# Patient Record
(demographics unavailable — no encounter records)

---

## 2024-10-31 NOTE — HEALTH RISK ASSESSMENT
[Independent] : using telephone [Some assistance needed] : managing finances [No falls in past year] : Patient reported no falls in the past year [Yes] : The patient does have visual impairment [HRA Reviewed] : Health risk assessment reviewed [FreeTextEntry8] : walk with cane [TimeGetUpGo] : 6 [de-identified] : ambulate with cane in home and outside, asking for wheelchair

## 2024-10-31 NOTE — HISTORY OF PRESENT ILLNESS
[In-Place] : has aide services in-place [A] : A [House Calls Co-Management Patient] : [unfilled] is a House Calls co-management patient [Patient is stable] : patient is stable [Education provided] : education provided [Patient] : patient [Spouse] : spouse [Family Member] : family member [Formal Caregiver] : formal caregiver [LastPCPVisitDate] : 10/30/24 [FreeTextEntry4] : cardiology, otolaryngology, ophthalmology [FreeTextEntry1] : CAD s/p CABG x 3, ICD off Eliquis,  b/l knee pain, unsteady gait, thrombocytopenia, [FreeTextEntry2] : 10/31/2024 COVID SCREEN: Patient or caretaker denies fever, cough, trouble breathing, rash, vomiting. Patient has not been in close contact with anyone who is COVID-19 positive, or suspected of having COVID-19.  N95 mask, gloves, eye wear and gown (if indicated) used during visit: Yes.  Total face to face time with patient is 45 min.  JESUS CHAPARRO is an 85 year with  hypertension, thrombocytopenia, CAD  s/p CABG x 3,  ICD, hard of hearing with aid, forgetfulness, hyperlipidemia, b/l breast pain, b/l knee pain, unsteady gait, Vitamin D deficiency, seen today for follow-up home visit  Accompanying patient today is spouse, daughter (Joanne) and HHA.   Today's visit and Review: - feels well overall - discontinued Eliquis; started Potassium but did not get from pharmacy and not sure of the dose - Medications reviewed - Weight: gained 3lbs in the last month - Mild cough, possibly related to recent flu vaccine - No issues with BM - No urinary issues - c/o knee pain affecting mobility, ordering wheelchair - Has referral for PT  Geriatric Assessment: -Appetite/weight: appetite good -Gait/falls: unsteady walking, using cane, asking for wheelchair, No falls -Pain: b/l pain Tylenol PRN -Sleep: sleep well -BMs: regular, no constipation/diarrhea, continent -Urine: no pain, no frequent/urgency, no cloudy/blood in urine, continent -Skin: intact with some bruises  -DME: cane -Mood/memory: alert, oriented x4, Kazakh speaking -Communication: conversational -Health Maintenance: vaccine UTD  Hospitalization #March 2024 ED at [facility] for nose bleeding. Pt discharged home on the same day.   #2021 [facility] admission  Assessments: 1. CAD (coronary artery disease)  -S/p CABG 12 years. -BP controlled on medication -continue current medication Amlodipine, carvedilol, valsartan -Daughter reports PCP d/c Eplerenone on 10/30/24 due to side effect of angioedema with b/l breast pain -following with cardiologist 2, ICD (implantable cardioverter-defibrillator) in place  -PCP d/c Eliquis on 10/30/24 due to thrombocytopenia -following with cardiologist 3. Right knee pain  -Tylenol PRN  -pending PT referral by PCP -waiting for knee brace -Asking for wheelchair   Patient/ patient's caregiver reports no weight gain >3 lbs in one months. No changes in dentition or swallowing reported, No changes in hearing or vision reported. No changes in Cognition reported. Patient denies any symptoms of depression or anxiety. Patient is ADL independent/dependent and IADL dependent. Changes in gait due to b/l knee pain, no falls reported.  Patient's home environment is safe.   Goals of care discussed 10min. Full Code

## 2024-10-31 NOTE — ADDENDUM
[FreeTextEntry1] : Documented by Izabella Mix acting as a scribe under Katalina Rodriguez NP. 10/31/2024

## 2024-10-31 NOTE — HISTORY OF PRESENT ILLNESS
[In-Place] : has aide services in-place [A] : A [House Calls Co-Management Patient] : [unfilled] is a House Calls co-management patient [Patient is stable] : patient is stable [Education provided] : education provided [Patient] : patient [Spouse] : spouse [Family Member] : family member [Formal Caregiver] : formal caregiver [LastPCPVisitDate] : 10/30/24 [FreeTextEntry4] : cardiology, otolaryngology, ophthalmology [FreeTextEntry1] : CAD s/p CABG x 3, ICD off Eliquis,  b/l knee pain, unsteady gait, thrombocytopenia, [FreeTextEntry2] : 10/31/2024 COVID SCREEN: Patient or caretaker denies fever, cough, trouble breathing, rash, vomiting. Patient has not been in close contact with anyone who is COVID-19 positive, or suspected of having COVID-19.  N95 mask, gloves, eye wear and gown (if indicated) used during visit: Yes.  Total face to face time with patient is 45 min.  JESUS CHAPARRO is an 85 year with  hypertension, thrombocytopenia, CAD  s/p CABG x 3,  ICD, hard of hearing with aid, forgetfulness, hyperlipidemia, b/l breast pain, b/l knee pain, unsteady gait, Vitamin D deficiency, seen today for follow-up home visit  Accompanying patient today is spouse, daughter (Joanne) and HHA.   Today's visit and Review: - feels well overall - discontinued Eliquis; started Potassium but did not get from pharmacy and not sure of the dose - Medications reviewed - Weight: gained 3lbs in the last month - Mild cough, possibly related to recent flu vaccine - No issues with BM - No urinary issues - c/o knee pain affecting mobility, ordering wheelchair - Has referral for PT  Geriatric Assessment: -Appetite/weight: appetite good -Gait/falls: unsteady walking, using cane, asking for wheelchair, No falls -Pain: b/l pain Tylenol PRN -Sleep: sleep well -BMs: regular, no constipation/diarrhea, continent -Urine: no pain, no frequent/urgency, no cloudy/blood in urine, continent -Skin: intact with some bruises  -DME: cane -Mood/memory: alert, oriented x4, Chilean speaking -Communication: conversational -Health Maintenance: vaccine UTD  Hospitalization #March 2024 ED at [facility] for nose bleeding. Pt discharged home on the same day.   #2021 [facility] Hospital admission  Assessments: 1. CAD (coronary artery disease)  -S/p CABG 12 years. -BP controlled on medication -continue current medication Amlodipine, carvedilol, valsartan -Daughter reports PCP d/c Eplerenone on 10/30/24 due to side effect of angioedema with b/l breast pain -following with cardiologist 2, ICD (implantable cardioverter-defibrillator) in place  -PCP d/c Eliquis on 10/30/24 due to thrombocytopenia -following with cardiologist 3. Right knee pain  -Tylenol PRN  -pending PT referral by PCP -waiting for knee brace -Asking for wheelchair   Patient/ patient's caregiver reports no weight gain >3 lbs in one months. No changes in dentition or swallowing reported, No changes in hearing or vision reported. No changes in Cognition reported. Patient denies any symptoms of depression or anxiety. Patient is ADL independent/dependent and IADL dependent. Changes in gait due to b/l knee pain, no falls reported.  Patient's home environment is safe.   Goals of care discussed 10min. Full Code

## 2024-10-31 NOTE — COUNSELING
[] : eye exam [Not Recommended] : Aspirin use not recommended due to overall prognosis [Advanced Directives discussed: ____] : Advanced directives discussed: [unfilled] [Annual Discussion and review of: ___] : Annual discussion and review of [unfilled] [Full Code] : Code Status: Full Code [No Limitations] : Treatment Guidelines: No limitations [Long Term Intubation] : Intubation: Long term intubation [Obese -  ( BMI  >29.9 )] : obese - ( BMI  >29.9 ) [Sodium restriction 2gm recommended] : sodium restriction 2 gm recommended [Non - Smoker] : non-smoker [Smoke/CO Detectors] : smoke/CO detectors [Use grab bars] : use grab bars [Use assistive device to avoid falls] : use assistive device to avoid falls [Remove clutter and unsafe carpeting to avoid falls] : remove clutter and unsafe carpeting to avoid falls [Patient not on disease-modifying anti-rheumatic drug due to overall prognosis] : Patient not on disease-modifying anti-rheumatic drug due to overall prognosis [Improve mobility] : improve mobility [Improve pain control] : improve pain control [Minimize unnecessary interventions] : minimize unnecessary interventions [Discussed disease trajectory with patient/caregiver] : discussed disease trajectory with patient/caregiver [_____] : HCP: [unfilled]

## 2024-10-31 NOTE — REVIEW OF SYSTEMS
[Vision Problems] : vision problems [Hearing Loss] : hearing loss [Joint Pain] : joint pain [Back Pain] : back pain [Confusion] : confusion [Unsteady Walk] : ataxia [Negative] : Heme/Lymph [Discharge] : no discharge [Pain] : no pain [Redness] : no redness [Dryness] : no dryness [Itching] : no itching [Earache] : no earache [Nosebleeds] : no nosebleeds [Postnasal Drip] : no postnasal drip [Nasal Discharge] : no nasal discharge [Sore Throat] : no sore throat [Hoarseness] : no hoarseness [Joint Stiffness] : no joint stiffness [Muscle Pain] : no muscle pain [Muscle Weakness] : no muscle weakness [Joint Swelling] : no joint swelling [Headache] : no headache [Dizziness] : no dizziness [Fainting] : no fainting [Memory Loss] : no memory loss [FreeTextEntry3] : Glaucoma [FreeTextEntry4] : Hearing loss with aid [FreeTextEntry9] : b/l knee pain [de-identified] : periods of forgetfulness, unsteady gait

## 2024-10-31 NOTE — CHRONIC CARE ASSESSMENT
[Less than 3 Times Per Week] : exercises less than 3 times per week [General Adherence] : and is generally adherent [___ Times Per Week] : exercises [unfilled] times per week [Walking] : walking [Low Fat Diet] : low fat [Low Carb Diet] : low carbohydrate [Low Salt Diet] : low salt [PPS Score: ____] : Palliative Performance Scale (PPS) Score: [unfilled] [de-identified] : Extensive cardiac history( CAD  s/p CABG, ICD), hard of hearing with aid, b/l knee pain, thrombocytopenia, [de-identified] : activity as tolerated. [de-identified] : low fat, low sodium, low carbohydrate

## 2024-10-31 NOTE — PHYSICAL EXAM
[Normal Voice/Communication] : normal voice communication [EOMI] : extra ocular movement intact [Normal TMs] : both tympanic membranes were normal [No LAD] : no lymphadenopathy [Clear to Auscultation] : lungs were clear to auscultation bilaterally [No Accessory Muscle Use] : no accessory muscle use [Regular Rhythm] : with a regular rhythm [Normal S1, S2] : normal S1 and S2 [No Murmurs] : no murmurs heard [No Edema] : there was no peripheral edema [No Acute Distress] : no acute distress [Normal Sclera/Conjunctiva] : normal sclera/conjunctiva [Normal Outer Ear/Nose] : the ears and nose were normal in appearance [No JVD] : no jugular venous distention [No Respiratory Distress] : no respiratory distress [Normal Rate] : heart rate was normal  [No Nipple Discharge] : no nipple discharge [Normal Bowel Sounds] : normal bowel sounds [Non Tender] : non-tender [Soft] : abdomen soft [Not Distended] : not distended [Normal Post Cervical Nodes] : no posterior cervical lymphadenopathy [Normal Anterior Cervical Nodes] : no anterior cervical lymphadenopathy [No CVA Tenderness] : no ~M costovertebral angle tenderness [No Skin Lesions] : no skin lesions [Cranial Nerves Intact] : cranial nerves 2-12 were intact [Normal Reflexes] : deep tendon reflexes were 2+ and symmetric [Oriented x3] : oriented to person, place, and time [Over the Past 2 Weeks, Have You Felt Down, Depressed, or Hopeless?] : 1.) Over the past 2 weeks, have you felt down, depressed, or hopeless? No [Over the Past 2 Weeks, Have You Felt Little Interest or Pleasure Doing Things?] : 2.) Over the past 2 weeks, have you felt little interest or pleasure doing things? No [Foot Ulcers] : no foot ulcers [de-identified] : Occitan speaking male received sitting in chair, able to walk with cane [de-identified] : using glasses [de-identified] : hard of hearing, b/l hearing aid [de-identified] : b/l breast pain [de-identified] : obese [de-identified] : unsteady gait [de-identified] : dry rash on the left foot

## 2024-10-31 NOTE — END OF VISIT
[FreeTextEntry3] : Documented by Izabella Mix acting as a scribe for Katalina Rodriguez NP. 10/31/2024   All medical record entries made by the Scribe were at my direction and personally dictated by me on 10/31/2024. I have reviewed the chart and agree that the record accurately reflects my personal performance of the history, physical exam, assessment and plan. I have also personally directed, reviewed, and agreed with the chart.

## 2024-10-31 NOTE — CHRONIC CARE ASSESSMENT
[Less than 3 Times Per Week] : exercises less than 3 times per week [General Adherence] : and is generally adherent [___ Times Per Week] : exercises [unfilled] times per week [Walking] : walking [Low Fat Diet] : low fat [Low Carb Diet] : low carbohydrate [Low Salt Diet] : low salt [PPS Score: ____] : Palliative Performance Scale (PPS) Score: [unfilled] [de-identified] : Extensive cardiac history( CAD  s/p CABG, ICD), hard of hearing with aid, b/l knee pain, thrombocytopenia, [de-identified] : activity as tolerated. [de-identified] : low fat, low sodium, low carbohydrate

## 2024-10-31 NOTE — PHYSICAL EXAM
[Normal Voice/Communication] : normal voice communication [EOMI] : extra ocular movement intact [Normal TMs] : both tympanic membranes were normal [No LAD] : no lymphadenopathy [Clear to Auscultation] : lungs were clear to auscultation bilaterally [No Accessory Muscle Use] : no accessory muscle use [Regular Rhythm] : with a regular rhythm [Normal S1, S2] : normal S1 and S2 [No Edema] : there was no peripheral edema [No Murmurs] : no murmurs heard [No Acute Distress] : no acute distress [Normal Sclera/Conjunctiva] : normal sclera/conjunctiva [Normal Outer Ear/Nose] : the ears and nose were normal in appearance [No JVD] : no jugular venous distention [No Respiratory Distress] : no respiratory distress [Normal Rate] : heart rate was normal  [No Nipple Discharge] : no nipple discharge [Normal Bowel Sounds] : normal bowel sounds [Non Tender] : non-tender [Soft] : abdomen soft [Not Distended] : not distended [Normal Post Cervical Nodes] : no posterior cervical lymphadenopathy [Normal Anterior Cervical Nodes] : no anterior cervical lymphadenopathy [No CVA Tenderness] : no ~M costovertebral angle tenderness [No Skin Lesions] : no skin lesions [Cranial Nerves Intact] : cranial nerves 2-12 were intact [Normal Reflexes] : deep tendon reflexes were 2+ and symmetric [Oriented x3] : oriented to person, place, and time [Over the Past 2 Weeks, Have You Felt Down, Depressed, or Hopeless?] : 1.) Over the past 2 weeks, have you felt down, depressed, or hopeless? No [Over the Past 2 Weeks, Have You Felt Little Interest or Pleasure Doing Things?] : 2.) Over the past 2 weeks, have you felt little interest or pleasure doing things? No [Foot Ulcers] : no foot ulcers [de-identified] : Upper sorbian speaking male received sitting in chair, able to walk with cane [de-identified] : using glasses [de-identified] : hard of hearing, b/l hearing aid [de-identified] : b/l breast pain [de-identified] : obese [de-identified] : unsteady gait [de-identified] : dry rash on the left foot

## 2024-10-31 NOTE — REASON FOR VISIT
[Follow-Up] : a follow-up visit [FreeTextEntry1] : Extensive cardiac history (hypertension, CAD  s/p CABG x 3,  ICD),  hard of hearing with aid, forgetfulness, b/l knee pain                                                   [Spouse] : spouse [Family Member] : family member [Formal Caregiver] : formal caregiver [Other: _____] : [unfilled] [Other: ______] : provided by CATHY [Time Spent: ____ minutes] : Total time spent using  services: [unfilled] minutes. The patient's primary language is not English thus required  services. [Pre-Visit Preparation] : pre-visit preparation was done [Intercurrent Specialty/Sub-specialty Visits] : the patient has intercurrent specialty/sub-specialty visits [FreeTextEntry2] : chart reviewed [FreeTextEntry3] : cardiology, otolaryngology, ophthalmology, [TWNoteComboBox1] : [place] Ze[place]ander

## 2024-10-31 NOTE — REASON FOR VISIT
[Follow-Up] : a follow-up visit [FreeTextEntry1] : Extensive cardiac history (hypertension, CAD  s/p CABG x 3,  ICD),  hard of hearing with aid, forgetfulness, b/l knee pain                                                   [Spouse] : spouse [Family Member] : family member [Formal Caregiver] : formal caregiver [Other: _____] : [unfilled] [Other: ______] : provided by CATHY [Time Spent: ____ minutes] : Total time spent using  services: [unfilled] minutes. The patient's primary language is not English thus required  services. [Pre-Visit Preparation] : pre-visit preparation was done [Intercurrent Specialty/Sub-specialty Visits] : the patient has intercurrent specialty/sub-specialty visits [FreeTextEntry2] : chart reviewed [FreeTextEntry3] : cardiology, otolaryngology, ophthalmology, [TWNoteComboBox1] : Congolese

## 2024-10-31 NOTE — REVIEW OF SYSTEMS
[Vision Problems] : vision problems [Hearing Loss] : hearing loss [Joint Pain] : joint pain [Back Pain] : back pain [Confusion] : confusion [Unsteady Walk] : ataxia [Negative] : Heme/Lymph [Discharge] : no discharge [Pain] : no pain [Redness] : no redness [Dryness] : no dryness [Itching] : no itching [Earache] : no earache [Nosebleeds] : no nosebleeds [Postnasal Drip] : no postnasal drip [Nasal Discharge] : no nasal discharge [Sore Throat] : no sore throat [Hoarseness] : no hoarseness [Joint Stiffness] : no joint stiffness [Muscle Pain] : no muscle pain [Muscle Weakness] : no muscle weakness [Joint Swelling] : no joint swelling [Headache] : no headache [Dizziness] : no dizziness [Fainting] : no fainting [Memory Loss] : no memory loss [FreeTextEntry3] : Glaucoma [FreeTextEntry4] : Hearing loss with aid [FreeTextEntry9] : b/l knee pain [de-identified] : periods of forgetfulness, unsteady gait

## 2024-10-31 NOTE — HEALTH RISK ASSESSMENT
[Independent] : using telephone [Some assistance needed] : managing finances [No falls in past year] : Patient reported no falls in the past year [Yes] : The patient does have visual impairment [HRA Reviewed] : Health risk assessment reviewed [FreeTextEntry8] : walk with cane [TimeGetUpGo] : 6 [de-identified] : ambulate with cane in home and outside, asking for wheelchair

## 2025-02-20 NOTE — END OF VISIT
[FreeTextEntry3] : All medical record entries made by the Scribe were at my direction and personally dictated by me. I have reviewed the chart and agree that the record accurately reflects my personal performance of the history, physical exam, assessment and plan. I have also personally directed, reviewed, and agreed with the chart.

## 2025-02-20 NOTE — CHRONIC CARE ASSESSMENT
[___ Times Per Week] : exercises [unfilled] times per week [General Adherence] : and is generally adherent [Walking] : walking [Low Fat Diet] : low fat [Low Carb Diet] : low carbohydrate [Low Salt Diet] : low salt [PPS Score: ____] : Palliative Performance Scale (PPS) Score: [unfilled] [de-identified] : Extensive cardiac history( CAD  s/p CABG, ICD), hard of hearing with aid, b/l knee pain, thrombocytopenia, [de-identified] : activity as tolerated. [FreeTextEntry9] : only go outside for medical visit [de-identified] : low fat, low sodium, low carbohydrate

## 2025-02-20 NOTE — HEALTH RISK ASSESSMENT
[Independent] : using telephone [Some assistance needed] : managing finances [No falls in past year] : Patient reported no falls in the past year [Yes] : The patient does have visual impairment [HRA Reviewed] : Health risk assessment reviewed [FreeTextEntry8] : walk with cane [TimeGetUpGo] : 4 [de-identified] : ambulate with cane in home and outside, unsteady gait

## 2025-02-20 NOTE — REASON FOR VISIT
[Follow-Up] : a follow-up visit [Family Member] : family member [Formal Caregiver] : formal caregiver [Pre-Visit Preparation] : pre-visit preparation was done [Intercurrent Specialty/Sub-specialty Visits] : the patient has intercurrent specialty/sub-specialty visits [Interpreters_FullName] : Joanne [FreeTextEntry2] : chart reviewed [FreeTextEntry3] : cardiology, otolaryngology, ophthalmology, [TWNoteComboBox1] : Bolivian

## 2025-02-20 NOTE — ADDENDUM
[FreeTextEntry1] : Documented by Izabella Mix acting as a scribe under Katalina Rodriguez NP. 02/20/2025

## 2025-02-20 NOTE — PHYSICAL EXAM
[No CVA Tenderness] : no ~M costovertebral angle tenderness [No Acute Distress] : no acute distress [Normal Voice/Communication] : normal voice communication [Normal Sclera/Conjunctiva] : normal sclera/conjunctiva [EOMI] : extra ocular movement intact [Normal Outer Ear/Nose] : the ears and nose were normal in appearance [Normal TMs] : both tympanic membranes were normal [No JVD] : no jugular venous distention [No LAD] : no lymphadenopathy [No Respiratory Distress] : no respiratory distress [Clear to Auscultation] : lungs were clear to auscultation bilaterally [No Accessory Muscle Use] : no accessory muscle use [Normal Rate] : heart rate was normal  [Regular Rhythm] : with a regular rhythm [Normal S1, S2] : normal S1 and S2 [No Murmurs] : no murmurs heard [No Edema] : there was no peripheral edema [No Nipple Discharge] : no nipple discharge [Normal Bowel Sounds] : normal bowel sounds [Non Tender] : non-tender [Soft] : abdomen soft [Not Distended] : not distended [Normal Post Cervical Nodes] : no posterior cervical lymphadenopathy [Normal Anterior Cervical Nodes] : no anterior cervical lymphadenopathy [No Joint Swelling] : no joint swelling seen [No Skin Lesions] : no skin lesions [Cranial Nerves Intact] : cranial nerves 2-12 were intact [Normal Reflexes] : deep tendon reflexes were 2+ and symmetric [Oriented x3] : oriented to person, place, and time [Over the Past 2 Weeks, Have You Felt Down, Depressed, or Hopeless?] : 1.) Over the past 2 weeks, have you felt down, depressed, or hopeless? No [Over the Past 2 Weeks, Have You Felt Little Interest or Pleasure Doing Things?] : 2.) Over the past 2 weeks, have you felt little interest or pleasure doing things? No [Foot Ulcers] : no foot ulcers [de-identified] : using glasses [de-identified] : hard of hearing, b/l hearing aid [de-identified] : b/l breast pain [de-identified] : obese [de-identified] : unsteady gait

## 2025-02-20 NOTE — HEALTH RISK ASSESSMENT
[Independent] : using telephone [Some assistance needed] : managing finances [No falls in past year] : Patient reported no falls in the past year [Yes] : The patient does have visual impairment [HRA Reviewed] : Health risk assessment reviewed [FreeTextEntry8] : walk with cane [TimeGetUpGo] : 4 [de-identified] : ambulate with cane in home and outside, unsteady gait

## 2025-02-20 NOTE — REASON FOR VISIT
[Follow-Up] : a follow-up visit [Family Member] : family member [Formal Caregiver] : formal caregiver [Pre-Visit Preparation] : pre-visit preparation was done [Intercurrent Specialty/Sub-specialty Visits] : the patient has intercurrent specialty/sub-specialty visits [Interpreters_FullName] : Joanne [FreeTextEntry2] : chart reviewed [FreeTextEntry3] : cardiology, otolaryngology, ophthalmology, [TWNoteComboBox1] : Citizen of [place]

## 2025-02-20 NOTE — CHRONIC CARE ASSESSMENT
[___ Times Per Week] : exercises [unfilled] times per week [General Adherence] : and is generally adherent [Walking] : walking [Low Fat Diet] : low fat [Low Carb Diet] : low carbohydrate [Low Salt Diet] : low salt [PPS Score: ____] : Palliative Performance Scale (PPS) Score: [unfilled] [de-identified] : Extensive cardiac history( CAD  s/p CABG, ICD), hard of hearing with aid, b/l knee pain, thrombocytopenia, [de-identified] : activity as tolerated. [FreeTextEntry9] : only go outside for medical visit [de-identified] : low fat, low sodium, low carbohydrate

## 2025-02-20 NOTE — PHYSICAL EXAM
[No CVA Tenderness] : no ~M costovertebral angle tenderness [No Acute Distress] : no acute distress [Normal Voice/Communication] : normal voice communication [Normal Sclera/Conjunctiva] : normal sclera/conjunctiva [EOMI] : extra ocular movement intact [Normal Outer Ear/Nose] : the ears and nose were normal in appearance [Normal TMs] : both tympanic membranes were normal [No JVD] : no jugular venous distention [No LAD] : no lymphadenopathy [No Respiratory Distress] : no respiratory distress [Clear to Auscultation] : lungs were clear to auscultation bilaterally [No Accessory Muscle Use] : no accessory muscle use [Normal Rate] : heart rate was normal  [Regular Rhythm] : with a regular rhythm [Normal S1, S2] : normal S1 and S2 [No Murmurs] : no murmurs heard [No Edema] : there was no peripheral edema [No Nipple Discharge] : no nipple discharge [Normal Bowel Sounds] : normal bowel sounds [Non Tender] : non-tender [Soft] : abdomen soft [Not Distended] : not distended [Normal Post Cervical Nodes] : no posterior cervical lymphadenopathy [Normal Anterior Cervical Nodes] : no anterior cervical lymphadenopathy [No Joint Swelling] : no joint swelling seen [No Skin Lesions] : no skin lesions [Cranial Nerves Intact] : cranial nerves 2-12 were intact [Normal Reflexes] : deep tendon reflexes were 2+ and symmetric [Oriented x3] : oriented to person, place, and time [Over the Past 2 Weeks, Have You Felt Down, Depressed, or Hopeless?] : 1.) Over the past 2 weeks, have you felt down, depressed, or hopeless? No [Over the Past 2 Weeks, Have You Felt Little Interest or Pleasure Doing Things?] : 2.) Over the past 2 weeks, have you felt little interest or pleasure doing things? No [Foot Ulcers] : no foot ulcers [de-identified] : using glasses [de-identified] : hard of hearing, b/l hearing aid [de-identified] : b/l breast pain [de-identified] : obese [de-identified] : unsteady gait

## 2025-02-20 NOTE — HISTORY OF PRESENT ILLNESS
[In-Place] : has aide services in-place [A] : A [House Calls Co-Management Patient] : [unfilled] is a House Calls co-management patient [Patient is stable] : patient is stable [Education provided] : education provided [Patient] : patient [Spouse] : spouse [Family Member] : family member [Formal Caregiver] : formal caregiver [LastPVisitDate] : 12/24 [FreeTextEntry4] : cardiology, otolaryngology, ophthalmology [FreeTextEntry1] : CAD s/p CABG x 3, ICD off Eliquis,  b/l knee pain, unsteady gait, thrombocytopenia, [FreeTextEntry2] : 2/20/25 COVID SCREEN: Patient or caretaker denies fever, cough, trouble breathing, rash, vomiting. Patient has not been in close contact with anyone who is COVID-19 positive, or suspected of having COVID-19.  N95 mask, gloves, eye wear and gown (if indicated) used during visit: Yes.  Total face to face time with patient is 45 min.  JESUS CHAPARRO is an 86 year with  hypertension, thrombocytopenia, CAD  s/p CABG x 3,  ICD, hard of hearing with aid, forgetfulness, hyperlipidemia, b/l breast pain, b/l knee pain, unsteady gait, Vitamin D deficiency, seen today for follow-up home visit  Accompanying patient today is spouse, daughter (Joanne) and HHA.  Patient and daughter Joanne was primary historian   Today's visit and Review 2/20/25: -Reported feeling tired lately - Recent labwork unremarkable from community PCP office - Reports R>L knee pain. Tylenol, knee brace, pain relief cream. - Reports concern over b/l nipple pain. No discharge, daughter reports lump on palpation. Referral given for outside GYN to assess. - EP last seen last year. Advised to f/u every 6 months. - Cardiologist and EP to be seen May. Advised to advance EP visit due to pt status of complaining tiredness  - Medications reviewed, refills sent to pharmacy. - Requests cane from DME. - Encouraged pt to move around more.  Geriatric Assessment: -Appetite/weight: appetite good -Gait/falls: unsteady walking, using cane,  No falls. -Pain: b/l knee pain Tylenol PRN -Sleep: sleep well -BMs: regular, no constipation/diarrhea, continent -Urine: no pain, no frequent/urgency, no cloudy/blood in urine, continent -Skin: intact -DME: cane -Mood/memory: alert, oriented x4, Slovenian speaking -Communication: conversational -Health Maintenance: vaccine UTD  Hospitalization #March 2024 ED at [facility] for nose bleeding. Pt discharged home on the same day.   #2021 [facility] admission  Patient/ patient's caregiver reports no weight gain >3 lbs in one months. No changes in dentition or swallowing reported, No changes in hearing or vision reported. No changes in Cognition reported. Patient denies any symptoms of depression or anxiety. Patient is ADL independent/dependent and IADL dependent.  Patient's home environment is safe.   Goals of care discussed 20min. Full Code  Discussion of advance care planning for JESUS CHAPARRO. Total Time Spent: 20 minutes Participants: Patient and Healthcare Proxy Discussion: Patient/HCP/daughter clearly state wishes Prognosis: Guarded Completed Forms:  MOLST not completed Goal of Care Summary:  Full code, Artificial feeding or hydration, determine use of antibiotics

## 2025-02-20 NOTE — HISTORY OF PRESENT ILLNESS
[In-Place] : has aide services in-place [A] : A [House Calls Co-Management Patient] : [unfilled] is a House Calls co-management patient [Patient is stable] : patient is stable [Education provided] : education provided [Patient] : patient [Spouse] : spouse [Family Member] : family member [Formal Caregiver] : formal caregiver [LastPVisitDate] : 12/24 [FreeTextEntry4] : cardiology, otolaryngology, ophthalmology [FreeTextEntry1] : CAD s/p CABG x 3, ICD off Eliquis,  b/l knee pain, unsteady gait, thrombocytopenia, [FreeTextEntry2] : 2/20/25 COVID SCREEN: Patient or caretaker denies fever, cough, trouble breathing, rash, vomiting. Patient has not been in close contact with anyone who is COVID-19 positive, or suspected of having COVID-19.  N95 mask, gloves, eye wear and gown (if indicated) used during visit: Yes.  Total face to face time with patient is 45 min.  JESUS CHAPARRO is an 86 year with  hypertension, thrombocytopenia, CAD  s/p CABG x 3,  ICD, hard of hearing with aid, forgetfulness, hyperlipidemia, b/l breast pain, b/l knee pain, unsteady gait, Vitamin D deficiency, seen today for follow-up home visit  Accompanying patient today is spouse, daughter (Joanne) and HHA.  Patient and daughter Joanne was primary historian   Today's visit and Review 2/20/25: -Reported feeling tired lately - Recent labwork unremarkable from community PCP office - Reports R>L knee pain. Tylenol, knee brace, pain relief cream. - Reports concern over b/l nipple pain. No discharge, daughter reports lump on palpation. Referral given for outside GYN to assess. - EP last seen last year. Advised to f/u every 6 months. - Cardiologist and EP to be seen May. Advised to advance EP visit due to pt status of complaining tiredness  - Medications reviewed, refills sent to pharmacy. - Requests cane from DME. - Encouraged pt to move around more.  Geriatric Assessment: -Appetite/weight: appetite good -Gait/falls: unsteady walking, using cane,  No falls. -Pain: b/l knee pain Tylenol PRN -Sleep: sleep well -BMs: regular, no constipation/diarrhea, continent -Urine: no pain, no frequent/urgency, no cloudy/blood in urine, continent -Skin: intact -DME: cane -Mood/memory: alert, oriented x4, Pashto speaking -Communication: conversational -Health Maintenance: vaccine UTD  Hospitalization #March 2024 ED at [facility] for nose bleeding. Pt discharged home on the same day.   #2021 [facility] admission  Patient/ patient's caregiver reports no weight gain >3 lbs in one months. No changes in dentition or swallowing reported, No changes in hearing or vision reported. No changes in Cognition reported. Patient denies any symptoms of depression or anxiety. Patient is ADL independent/dependent and IADL dependent.  Patient's home environment is safe.   Goals of care discussed 20min. Full Code  Discussion of advance care planning for JESUS CHAPARRO. Total Time Spent: 20 minutes Participants: Patient and Healthcare Proxy Discussion: Patient/HCP/daughter clearly state wishes Prognosis: Guarded Completed Forms:  MOLST not completed Goal of Care Summary:  Full code, Artificial feeding or hydration, determine use of antibiotics

## 2025-02-20 NOTE — COUNSELING
[Not Recommended] : Aspirin use not recommended due to overall prognosis [Full Code] : Code Status: Full Code [No Limitations] : Treatment Guidelines: No limitations [Long Term Intubation] : Intubation: Long term intubation [Obese -  ( BMI  >29.9 )] : obese - ( BMI  >29.9 ) [Sodium restriction 2gm recommended] : sodium restriction 2 gm recommended [Non - Smoker] : non-smoker [Smoke/CO Detectors] : smoke/CO detectors [Use grab bars] : use grab bars [Use assistive device to avoid falls] : use assistive device to avoid falls [Remove clutter and unsafe carpeting to avoid falls] : remove clutter and unsafe carpeting to avoid falls [] : bone density screening [Patient not on disease-modifying anti-rheumatic drug due to overall prognosis] : Patient not on disease-modifying anti-rheumatic drug due to overall prognosis [Improve mobility] : improve mobility [Improve pain control] : improve pain control [Minimize unnecessary interventions] : minimize unnecessary interventions [Discussed disease trajectory with patient/caregiver] : discussed disease trajectory with patient/caregiver [Advanced Directives discussed: ____] : Advanced directives discussed: [unfilled] [Annual Discussion and review of: ___] : Annual discussion and review of [unfilled] [_____] : HCP: [unfilled]

## 2025-02-20 NOTE — REVIEW OF SYSTEMS
[Vision Problems] : vision problems [Hearing Loss] : hearing loss [Joint Pain] : joint pain [Back Pain] : back pain [Confusion] : confusion [Unsteady Walk] : ataxia [Negative] : Heme/Lymph [Discharge] : no discharge [Pain] : no pain [Redness] : no redness [Dryness] : no dryness [Itching] : no itching [Earache] : no earache [Nosebleeds] : no nosebleeds [Postnasal Drip] : no postnasal drip [Nasal Discharge] : no nasal discharge [Sore Throat] : no sore throat [Hoarseness] : no hoarseness [Joint Stiffness] : no joint stiffness [Muscle Pain] : no muscle pain [Muscle Weakness] : no muscle weakness [Joint Swelling] : no joint swelling [Headache] : no headache [Dizziness] : no dizziness [Fainting] : no fainting [Memory Loss] : no memory loss [FreeTextEntry3] : Glaucoma [FreeTextEntry4] : Hearing loss using aid [FreeTextEntry9] : b/l knee pain [de-identified] : periods of forgetfulness, unsteady gait

## 2025-02-20 NOTE — REVIEW OF SYSTEMS
[Vision Problems] : vision problems [Hearing Loss] : hearing loss [Joint Pain] : joint pain [Back Pain] : back pain [Confusion] : confusion [Unsteady Walk] : ataxia [Negative] : Heme/Lymph [Discharge] : no discharge [Pain] : no pain [Redness] : no redness [Dryness] : no dryness [Itching] : no itching [Earache] : no earache [Nosebleeds] : no nosebleeds [Postnasal Drip] : no postnasal drip [Nasal Discharge] : no nasal discharge [Sore Throat] : no sore throat [Hoarseness] : no hoarseness [Joint Stiffness] : no joint stiffness [Muscle Pain] : no muscle pain [Muscle Weakness] : no muscle weakness [Joint Swelling] : no joint swelling [Headache] : no headache [Dizziness] : no dizziness [Fainting] : no fainting [Memory Loss] : no memory loss [FreeTextEntry3] : Glaucoma [FreeTextEntry4] : Hearing loss using aid [FreeTextEntry9] : b/l knee pain [de-identified] : periods of forgetfulness, unsteady gait

## 2025-03-14 NOTE — PHYSICAL EXAM
[Normocephalic] : normocephalic [No Supraclavicular Adenopathy] : no supraclavicular adenopathy [No Cervical Adenopathy] : no cervical adenopathy [Examined in the supine and seated position] : examined in the supine and seated position [Symmetrical] : symmetrical [No dominant masses] : no dominant masses in right breast  [No dominant masses] : no dominant masses left breast [No Nipple Retraction] : no left nipple retraction [No Nipple Discharge] : no left nipple discharge [No Axillary Lymphadenopathy] : no left axillary lymphadenopathy [No Edema] : no edema [No Rashes] : no rashes [No Ulceration] : no ulceration [Breast Nipple Inversion] : nipples not inverted [Breast Nipple Retraction] : nipples not retracted [Breast Nipple Flattening] : nipples not flattened [Breast Nipple Fissures] : nipples not fissured [de-identified] : Soft central area of breast tissue, consistent with bilateral gynecomastia; tender to palpation

## 2025-03-14 NOTE — PLAN
[TextEntry] : Diagnostic mammogram Interventions as noted above Will call with imaging results, pending findings and symptoms can consider endocrine workup RTC in 6 months, or sooner if symptoms worsen

## 2025-03-14 NOTE — ASSESSMENT
[FreeTextEntry1] : 86-year-old male presents for painful breast tissue; breast exam is consistent with gynecomastia.  I explained to the patient and his family that I do recommend a bilateral diagnostic mammogram to complete the workup and definitively rule out other causes, such as malignancy.    I did provide reassurance that clinical exam is consistent with gynecomastia, reviewed the pathogenesis of gynecomastia which is often multifactorial in his case likely related to advanced age, medication (atorvastatin), & obesity.  Will reach out to the patient with results of the mammogram once available, and we will plan on seeing the patient in 6 months for reevaluation.   Reviewed ways to manage the pain, including Ace bandages for mild compression, the use of Tiger balm, Tylenol as needed, and warm/cold compresses.   If the symptoms persist, or worsen at the time of follow-up (or prior to) can consider further workup with labs to evaluate hormone levels vs endocrine consult.   All questions were answered; the patient verbalized understanding and is in agreement with the plan.

## 2025-03-14 NOTE — REVIEW OF SYSTEMS
[Arthralgias] : arthralgias [As Noted in HPI] : as noted in HPI [Negative] : Heme/Lymph [FreeTextEntry9] : Neck pain [de-identified] : Breast pain

## 2025-03-14 NOTE — HISTORY OF PRESENT ILLNESS
[FreeTextEntry1] : 86-year-old male, primarily Slovenian-speaking, presents with his family, for bilateral breast pain.  Patient states that he first noticed tenderness to palpation about a month ago, the pain is bilateral but it is worse in the left side.  It is intermittent, mostly occurring with palpation.  He notes that the area around his nipples are particularly sensitive.  He denies palpable masses, skin changes, or nipple discharge.  No breast imaging has been completed.

## 2025-03-14 NOTE — REASON FOR VISIT
[Consultation] : a consultation visit [Interpreters_IDNumber] : [id number] [Interpreters_FullName] : Braxton [TWNoteComboBox1] : Solomon Islander

## 2025-06-12 NOTE — REVIEW OF SYSTEMS
[Vision Problems] : vision problems [Hearing Loss] : hearing loss [Joint Pain] : joint pain [Back Pain] : back pain [Confusion] : confusion [Unsteady Walk] : ataxia [Memory Loss] : memory loss [Negative] : Heme/Lymph [Discharge] : no discharge [Pain] : no pain [Redness] : no redness [Dryness] : no dryness [Itching] : no itching [Earache] : no earache [Nosebleeds] : no nosebleeds [Postnasal Drip] : no postnasal drip [Nasal Discharge] : no nasal discharge [Sore Throat] : no sore throat [Hoarseness] : no hoarseness [Joint Stiffness] : no joint stiffness [Muscle Pain] : no muscle pain [Muscle Weakness] : no muscle weakness [Joint Swelling] : no joint swelling [Headache] : no headache [Dizziness] : no dizziness [Fainting] : no fainting [FreeTextEntry3] : Glaucoma [FreeTextEntry4] : b/l hearing aid [FreeTextEntry9] : b/l knee pain [de-identified] : periods of forgetfulness, unsteady gait

## 2025-06-12 NOTE — REASON FOR VISIT
[Follow-Up] : a follow-up visit [Other: ______] : provided by CATHY [Family Member] : family member [Formal Caregiver] : formal caregiver [Other: _____] : [unfilled] [Language Line ] : provided by Language Line   [Time Spent: ____ minutes] : Total time spent using  services: [unfilled] minutes. The patient's primary language is not English thus required  services. [Pre-Visit Preparation] : pre-visit preparation was done [Intercurrent Specialty/Sub-specialty Visits] : the patient has intercurrent specialty/sub-specialty visits [Interpreters_FullName] : Raman [FreeTextEntry2] : chart reviewed [FreeTextEntry3] : cardiology, otolaryngology, ophthalmology, [TWNoteComboBox1] : Chilean

## 2025-06-12 NOTE — REASON FOR VISIT
[Follow-Up] : a follow-up visit [Other: ______] : provided by CATHY [Family Member] : family member [Formal Caregiver] : formal caregiver [Other: _____] : [unfilled] [Language Line ] : provided by Language Line   [Time Spent: ____ minutes] : Total time spent using  services: [unfilled] minutes. The patient's primary language is not English thus required  services. [Pre-Visit Preparation] : pre-visit preparation was done [Intercurrent Specialty/Sub-specialty Visits] : the patient has intercurrent specialty/sub-specialty visits [Interpreters_FullName] : Raman [FreeTextEntry2] : chart reviewed [FreeTextEntry3] : cardiology, otolaryngology, ophthalmology, [TWNoteComboBox1] : Malaysian

## 2025-06-12 NOTE — HISTORY OF PRESENT ILLNESS
[In-Place] : has aide services in-place [A] : A [LastPVisitDate] : 12/24 [House Calls Co-Management Patient] : [unfilled] is a House Calls co-management patient [Patient is stable] : patient is stable [Education provided] : education provided [Patient] : patient [Spouse] : spouse [Family Member] : family member [Formal Caregiver] : formal caregiver [FreeTextEntry4] : cardiology, otolaryngology, ophthalmology [FreeTextEntry1] : CAD s/p CABG x 3, ICD off Eliquis,  b/l knee pain, unsteady gait, thrombocytopenia, no elevated in building  [FreeTextEntry2] : 06/12/25 COVID SCREEN: Patient or caretaker denies fever, cough, trouble breathing, rash, vomiting. Patient has not been in close contact with anyone who is COVID-19 positive, or suspected of having COVID-19.  N95 mask, gloves, eye wear and gown (if indicated) used during visit: Yes.  Total face to face time with patient is 45 min.  JESUS CHAPARRO is an 86 year with  hypertension, thrombocytopenia, CAD  s/p CABG x 3,  ICD, hard of hearing with aid, forgetfulness, hyperlipidemia, b/l breast pain, b/l knee pain, unsteady gait, Vitamin D deficiency, seen today for follow-up home visit  Accompanying patient today is spouse, son and HHA.  Patient/spouse and caregiver  was primary historian.  ([employer] ID # [id number]) present via phone call.  Today's visit and Review 06/12/25: - Presents today for follow-up, doing well overall.  - Family member reports recent arm injury from unknown origin. Denies pain, numbness or tingling. Arm not swelling, no redness.  -Family member reports adverse side effect (itchiness and swelling of tongue, arms, and hands) from new medication (Doptelet) started few months ago. Advised to contact prescribing provider Dr. James Meneses for discussion of medication alteration. Advised not to stop using medication until spoken with prescriber. Advised to [facility] to [facility] or call 911 when reaction with itchiness, swelling of tongue, arms and hands occur again.     - Provider to refer to endocrinologist following abnormal mammogram of b/l breast which shows gynecomastia. Discussed recommendation to proceed with workup with an endocrinologist to determine if there is an underlying endocrine issue such as a testicular tumor or adrenal neoplasm. I did revied. Patient's wife verbalized understanding. Consult request will be placed through [facility].  - [facility] new smaller [facility] via [facility] - Denies recent hospitalization - Medications reviewed and reconciled. No refill needed  Geriatric Assessment: -Appetite/weight: appetite good -Gait/falls: unsteady walking, using cane,  No falls. -Pain: b/l knee pain -Sleep: sleep well -BMs: regular, no constipation/diarrhea, continent -Urine: no pain, no frequent/urgency, no cloudy/blood in urine, continent -Skin: intact -DME: cane, wheelchair -Mood/memory: alert, oriented x4, Swedish speaking -Communication: conversational -Health Maintenance: vaccine UTD  Patient/ patient's caregiver reports no weight gain >3 lbs in one months. No changes in dentition or swallowing reported, No change in vision reported. No changes in Cognition reported. Patient denies any symptoms of depression or anxiety. Patient is ADL independent/dependent and IADL independent/dependent.  Patient's home environment is safe.   Goals of care discussed 10min. Full Code no limitations

## 2025-06-12 NOTE — CHRONIC CARE ASSESSMENT
[___ Times Per Week] : exercises [unfilled] times per week [General Adherence] : and is generally adherent [PPS Score: ____] : Palliative Performance Scale (PPS) Score: [unfilled] [Walking] : walking [Low Fat Diet] : low fat [Low Carb Diet] : low carbohydrate [Low Salt Diet] : low salt [de-identified] : Extensive cardiac history( CAD  s/p CABG, ICD), hard of hearing with aid, b/l knee pain, thrombocytopenia, [de-identified] : activity as tolerated. [FreeTextEntry9] : only go outside for medical visit [de-identified] : low fat, low sodium, low carbohydrate

## 2025-06-12 NOTE — REVIEW OF SYSTEMS
[Vision Problems] : vision problems [Hearing Loss] : hearing loss [Joint Pain] : joint pain [Back Pain] : back pain [Confusion] : confusion [Unsteady Walk] : ataxia [Memory Loss] : memory loss [Negative] : Heme/Lymph [Discharge] : no discharge [Pain] : no pain [Redness] : no redness [Dryness] : no dryness [Itching] : no itching [Earache] : no earache [Nosebleeds] : no nosebleeds [Postnasal Drip] : no postnasal drip [Nasal Discharge] : no nasal discharge [Sore Throat] : no sore throat [Hoarseness] : no hoarseness [Joint Stiffness] : no joint stiffness [Muscle Pain] : no muscle pain [Muscle Weakness] : no muscle weakness [Joint Swelling] : no joint swelling [Headache] : no headache [Dizziness] : no dizziness [Fainting] : no fainting [FreeTextEntry3] : Glaucoma [FreeTextEntry4] : b/l hearing aid [FreeTextEntry9] : b/l knee pain [de-identified] : periods of forgetfulness, unsteady gait

## 2025-06-12 NOTE — LETTER HEADER
[Care Plan reviewed every ___ weeks] : Care plan reviewed every [unfilled] weeks [Care Plan managed/Care coordinated by: ___] : Care plan managed/Care coordinated by: [unfilled] [Care Plan reviewed and provided to patient/caregiver] : Care plan reviewed and provided to patient/caregiver [Initiation or substantial revisions made to care plan involving mod/high medical decision making for complex CCM] : Initiation or substantial revisions made to care plan involving mod/high medical decision making for complex CCM [Patient/Caregiver agrees to have other providers send summary of their care to this office] : Patient/caregiver agrees to have other providers send summary of their care to this office

## 2025-06-12 NOTE — COUNSELING
[Not Recommended] : Aspirin use not recommended due to overall prognosis [Advanced Directives discussed: ____] : Advanced directives discussed: [unfilled] [Annual Discussion and review of: ___] : Annual discussion and review of [unfilled] [Full Code] : Code Status: Full Code [No Limitations] : Treatment Guidelines: No limitations [Long Term Intubation] : Intubation: Long term intubation [_____] : HCP: [unfilled] [Obese -  ( BMI  >29.9 )] : obese - ( BMI  >29.9 ) [Sodium restriction 2gm recommended] : sodium restriction 2 gm recommended [Non - Smoker] : non-smoker [Smoke/CO Detectors] : smoke/CO detectors [Use grab bars] : use grab bars [Use assistive device to avoid falls] : use assistive device to avoid falls [Remove clutter and unsafe carpeting to avoid falls] : remove clutter and unsafe carpeting to avoid falls [] : mammogram [Patient not on disease-modifying anti-rheumatic drug due to overall prognosis] : Patient not on disease-modifying anti-rheumatic drug due to overall prognosis [Improve mobility] : improve mobility [Improve pain control] : improve pain control [Minimize unnecessary interventions] : minimize unnecessary interventions [Discussed disease trajectory with patient/caregiver] : discussed disease trajectory with patient/caregiver

## 2025-06-12 NOTE — HEALTH RISK ASSESSMENT
[Independent] : using telephone [Some assistance needed] : managing finances [No falls in past year] : Patient reported no falls in the past year [Yes] : The patient does have visual impairment [HRA Reviewed] : Health risk assessment reviewed [FreeTextEntry8] : walk with cane [TimeGetUpGo] : 0 [de-identified] : did not participate

## 2025-06-12 NOTE — PHYSICAL EXAM
[No Acute Distress] : no acute distress [Normal Voice/Communication] : normal voice communication [Normal Sclera/Conjunctiva] : normal sclera/conjunctiva [EOMI] : extra ocular movement intact [Normal Outer Ear/Nose] : the ears and nose were normal in appearance [Normal TMs] : both tympanic membranes were normal [No JVD] : no jugular venous distention [No LAD] : no lymphadenopathy [No Respiratory Distress] : no respiratory distress [Clear to Auscultation] : lungs were clear to auscultation bilaterally [No Accessory Muscle Use] : no accessory muscle use [Normal Rate] : heart rate was normal  [Regular Rhythm] : with a regular rhythm [Normal S1, S2] : normal S1 and S2 [No Murmurs] : no murmurs heard [No Edema] : there was no peripheral edema [No Nipple Discharge] : no nipple discharge [Normal Bowel Sounds] : normal bowel sounds [Non Tender] : non-tender [Soft] : abdomen soft [Not Distended] : not distended [Normal Post Cervical Nodes] : no posterior cervical lymphadenopathy [Normal Anterior Cervical Nodes] : no anterior cervical lymphadenopathy [No Joint Swelling] : no joint swelling seen [No Skin Lesions] : no skin lesions [Cranial Nerves Intact] : cranial nerves 2-12 were intact [Normal Reflexes] : deep tendon reflexes were 2+ and symmetric [Oriented x3] : oriented to person, place, and time [Over the Past 2 Weeks, Have You Felt Down, Depressed, or Hopeless?] : 1.) Over the past 2 weeks, have you felt down, depressed, or hopeless? No [Over the Past 2 Weeks, Have You Felt Little Interest or Pleasure Doing Things?] : 2.) Over the past 2 weeks, have you felt little interest or pleasure doing things? No [Foot Ulcers] : no foot ulcers [de-identified] : using glasses [de-identified] : hard of hearing, b/l hearing aid [de-identified] : b/l breast pain [de-identified] : obese [de-identified] : unsteady gait

## 2025-06-12 NOTE — PHYSICAL EXAM
[No Acute Distress] : no acute distress [Normal Voice/Communication] : normal voice communication [Normal Sclera/Conjunctiva] : normal sclera/conjunctiva [EOMI] : extra ocular movement intact [Normal Outer Ear/Nose] : the ears and nose were normal in appearance [Normal TMs] : both tympanic membranes were normal [No JVD] : no jugular venous distention [No LAD] : no lymphadenopathy [No Respiratory Distress] : no respiratory distress [Clear to Auscultation] : lungs were clear to auscultation bilaterally [No Accessory Muscle Use] : no accessory muscle use [Normal Rate] : heart rate was normal  [Regular Rhythm] : with a regular rhythm [Normal S1, S2] : normal S1 and S2 [No Murmurs] : no murmurs heard [No Edema] : there was no peripheral edema [No Nipple Discharge] : no nipple discharge [Normal Bowel Sounds] : normal bowel sounds [Non Tender] : non-tender [Soft] : abdomen soft [Not Distended] : not distended [Normal Post Cervical Nodes] : no posterior cervical lymphadenopathy [Normal Anterior Cervical Nodes] : no anterior cervical lymphadenopathy [No Joint Swelling] : no joint swelling seen [No Skin Lesions] : no skin lesions [Cranial Nerves Intact] : cranial nerves 2-12 were intact [Normal Reflexes] : deep tendon reflexes were 2+ and symmetric [Oriented x3] : oriented to person, place, and time [Over the Past 2 Weeks, Have You Felt Down, Depressed, or Hopeless?] : 1.) Over the past 2 weeks, have you felt down, depressed, or hopeless? No [Over the Past 2 Weeks, Have You Felt Little Interest or Pleasure Doing Things?] : 2.) Over the past 2 weeks, have you felt little interest or pleasure doing things? No [Foot Ulcers] : no foot ulcers [de-identified] : using glasses [de-identified] : hard of hearing, b/l hearing aid [de-identified] : b/l breast pain [de-identified] : obese [de-identified] : unsteady gait

## 2025-06-12 NOTE — CURRENT MEDS
[Adherent to medications] : Patient is adherent to medications as prescribed [High Risk Medications Reviewed and Reconciled (Beers Criteria)] : high risk medications reviewed and reconciled [Medication and Allergies Reconciled] : medication and allergies reconciled [Reviewed patient reported medication adherence from Comprehensive Assessment] : Reviewed patient reported medication adherence from comprehensive assessment

## 2025-06-12 NOTE — HISTORY OF PRESENT ILLNESS
[In-Place] : has aide services in-place [A] : A [LastPVisitDate] : 12/24 [House Calls Co-Management Patient] : [unfilled] is a House Calls co-management patient [Patient is stable] : patient is stable [Education provided] : education provided [Patient] : patient [Spouse] : spouse [Family Member] : family member [Formal Caregiver] : formal caregiver [FreeTextEntry4] : cardiology, otolaryngology, ophthalmology [FreeTextEntry2] : 06/12/25 COVID SCREEN: Patient or caretaker denies fever, cough, trouble breathing, rash, vomiting. Patient has not been in close contact with anyone who is COVID-19 positive, or suspected of having COVID-19.  N95 mask, gloves, eye wear and gown (if indicated) used during visit: Yes.  Total face to face time with patient is 45 min.  JESUS CHAPARRO is an 86 year with  hypertension, thrombocytopenia, CAD  s/p CABG x 3,  ICD, hard of hearing with aid, forgetfulness, hyperlipidemia, b/l breast pain, b/l knee pain, unsteady gait, Vitamin D deficiency, seen today for follow-up home visit  Accompanying patient today is spouse, son and HHA.  Patient/spouse and caregiver  was primary historian.  ([employer] ID # [id number]) present via phone call.  Today's visit and Review 06/12/25: - Presents today for follow-up, doing well overall.  - Family member reports recent arm injury from unknown origin. Denies pain, numbness or tingling. Arm not swelling, no redness.  -Family member reports adverse side effect (itchiness and swelling of tongue, arms, and hands) from new medication (Doptelet) started few months ago. Advised to contact prescribing provider Dr. James Meneses for discussion of medication alteration. Advised not to stop using medication until spoken with prescriber. Advised to report to [facility] or call 911 when reaction with itchiness, swelling of tongue, arms and hands occur again.     - Provider to refer to endocrinologist following abnormal mammogram of b/l breast which shows gynecomastia. Discussed recommendation to proceed with workup with an endocrinologist to determine if there is an underlying endocrine issue such as a testicular tumor or adrenal neoplasm. I did revied. Patient's wife verbalized understanding. Consult [facility] will be [facility] through [facility].  - Requested for new smaller wheelchair via [facility] - Denies recent hospitalization - Medications reviewed and reconciled. No refill needed  Geriatric Assessment: -Appetite/weight: appetite good -Gait/falls: unsteady walking, using cane,  No falls. -Pain: b/l knee pain -Sleep: sleep well -BMs: regular, no constipation/diarrhea, continent -Urine: no pain, no frequent/urgency, no cloudy/blood in urine, continent -Skin: intact -DME: cane, wheelchair -Mood/memory: alert, oriented x4, Croatian speaking -Communication: conversational -Health Maintenance: vaccine UTD  Patient/ patient's caregiver reports no weight gain >3 lbs in one months. No changes in dentition or swallowing reported, No change in vision reported. No changes in Cognition reported. Patient denies any symptoms of depression or anxiety. Patient is ADL independent/dependent and IADL independent/dependent.  Patient's home environment is safe.   Goals of care discussed 10min. Full Code no limitations  [FreeTextEntry1] : CAD s/p CABG x 3, ICD off Eliquis,  b/l knee pain, unsteady gait, thrombocytopenia, no elevated in building

## 2025-06-12 NOTE — HEALTH RISK ASSESSMENT
[Independent] : using telephone [Some assistance needed] : managing finances [No falls in past year] : Patient reported no falls in the past year [Yes] : The patient does have visual impairment [HRA Reviewed] : Health risk assessment reviewed [FreeTextEntry8] : walk with cane [TimeGetUpGo] : 0 [de-identified] : did not participate

## 2025-06-12 NOTE — CHRONIC CARE ASSESSMENT
[___ Times Per Week] : exercises [unfilled] times per week [General Adherence] : and is generally adherent [PPS Score: ____] : Palliative Performance Scale (PPS) Score: [unfilled] [Walking] : walking [Low Fat Diet] : low fat [Low Carb Diet] : low carbohydrate [Low Salt Diet] : low salt [de-identified] : Extensive cardiac history( CAD  s/p CABG, ICD), hard of hearing with aid, b/l knee pain, thrombocytopenia, [de-identified] : activity as tolerated. [FreeTextEntry9] : only go outside for medical visit [de-identified] : low fat, low sodium, low carbohydrate